# Patient Record
Sex: MALE | Race: WHITE | NOT HISPANIC OR LATINO | Employment: FULL TIME | URBAN - NONMETROPOLITAN AREA
[De-identification: names, ages, dates, MRNs, and addresses within clinical notes are randomized per-mention and may not be internally consistent; named-entity substitution may affect disease eponyms.]

---

## 2018-04-11 RX ORDER — OLMESARTAN MEDOXOMIL 40 MG/1
40 TABLET ORAL DAILY
COMMUNITY
Start: 2018-02-07

## 2018-04-11 RX ORDER — ESOMEPRAZOLE MAGNESIUM 40 MG/1
40 CAPSULE, DELAYED RELEASE ORAL
COMMUNITY

## 2018-04-11 RX ORDER — PROPRANOLOL HYDROCHLORIDE 80 MG/1
80 TABLET ORAL DAILY
Status: ON HOLD | COMMUNITY
End: 2018-05-08

## 2018-04-12 ENCOUNTER — OFFICE VISIT (OUTPATIENT)
Dept: GASTROENTEROLOGY | Facility: CLINIC | Age: 68
End: 2018-04-12

## 2018-04-12 VITALS
BODY MASS INDEX: 21.48 KG/M2 | WEIGHT: 145 LBS | HEART RATE: 80 BPM | TEMPERATURE: 98.2 F | HEIGHT: 69 IN | OXYGEN SATURATION: 99 %

## 2018-04-12 DIAGNOSIS — Z12.11 ENCOUNTER FOR SCREENING FOR MALIGNANT NEOPLASM OF COLON: Primary | ICD-10-CM

## 2018-04-12 PROCEDURE — S0260 H&P FOR SURGERY: HCPCS | Performed by: NURSE PRACTITIONER

## 2018-04-12 NOTE — PROGRESS NOTES
Chief Complaint   Patient presents with   • Colonoscopy     had colon several years ago dr. wright was good     Subjective   HPI    Jacob Saravia is a 67 y.o. male who presents to office for preventative maintenance.  There is not  a personal history of colon polyps.  There is not a history of colon cancer.  He does not have complaints of nausea/vomiting, change in bowels, weight loss, no , no melena.  There is not a family history of colon cancer.  There is not a family history of colon polyps.  Pt last colonoscopy-no polyps with Dr Wright .  Bowels do move on regular basis.  Bleeding x 20 yrs.  Bright red blood will drip into toilet.  Bleeding has worsened.  Pt denies pain when sitting.    Scheduled with Dr Mckinney for Hemorrhoidectomy, recommended to have cscope prior to surgery    Reports no previous treatment of hemorrhoids due to hx of PCV and reports topical steroids increase PVC, with exception of soaks in bath.  States hemorrhoids are effecting day to day activity.    Past Medical History:   Diagnosis Date   • Hyperlipidemia    • Hypertension      Past Surgical History:   Procedure Laterality Date   • CHOLECYSTECTOMY       Outpatient Prescriptions Marked as Taking for the 4/12/18 encounter (Office Visit) with CAMERON Singleton   Medication Sig Dispense Refill   • esomeprazole (NEXIUM) 2.5 MG packet Take 2.5 mg by mouth 2 (Two) Times a Day.     • olmesartan (BENICAR) 40 MG tablet      • propranolol (INDERAL) 80 MG tablet Take 80 mg by mouth 3 (Three) Times a Day.       Allergies   Allergen Reactions   • Crestor [Rosuvastatin Calcium] Unknown (See Comments)     reflux   • Decongestant [Oxymetazoline] Other (See Comments)     bothers heart   • Erythromycin Base Unknown (See Comments)     unsure   • Ezetimibe Other (See Comments)     unsure   • Omeprazole Other (See Comments)     hearache   • Pravachol [Pravastatin Sodium] Other (See Comments)     reflux     Social History     Social History   •  Marital status:      Spouse name: N/A   • Number of children: N/A   • Years of education: N/A     Occupational History   • Not on file.     Social History Main Topics   • Smoking status: Former Smoker   • Smokeless tobacco: Never Used   • Alcohol use No   • Drug use: No   • Sexual activity: Not on file     Other Topics Concern   • Not on file     Social History Narrative   • No narrative on file     Family History   Problem Relation Age of Onset   • Colon cancer Neg Hx    • Esophageal cancer Neg Hx      Review of Systems   Constitutional: Negative for fatigue, fever and unexpected weight change.   HENT: Negative for hearing loss, sore throat and voice change.    Eyes: Negative for visual disturbance.   Respiratory: Negative for cough, shortness of breath and wheezing.    Cardiovascular: Negative for chest pain and palpitations.   Gastrointestinal: Positive for anal bleeding. Negative for abdominal pain, blood in stool and vomiting.   Endocrine: Negative for polydipsia and polyuria.   Genitourinary: Negative for difficulty urinating, dysuria, hematuria and urgency.   Musculoskeletal: Negative for joint swelling and myalgias.   Skin: Negative for color change, rash and wound.   Neurological: Negative for dizziness, tremors, seizures and syncope.   Hematological: Does not bruise/bleed easily.   Psychiatric/Behavioral: Negative for agitation and confusion. The patient is not nervous/anxious.      Objective   Vitals:    04/12/18 1353   Pulse: 80   Temp: 98.2 °F (36.8 °C)   SpO2: 99%     Physical Exam   Constitutional: He is oriented to person, place, and time. He appears well-developed and well-nourished. He is cooperative.   HENT:   Head: Normocephalic and atraumatic.   Eyes: Conjunctivae are normal. Pupils are equal, round, and reactive to light. No scleral icterus.   Neck: Normal range of motion. Neck supple. No JVD present. No thyroid mass and no thyromegaly present.   Cardiovascular: Normal rate, regular  rhythm and normal heart sounds.  Exam reveals no gallop and no friction rub.    No murmur heard.  Pulmonary/Chest: Effort normal and breath sounds normal. No accessory muscle usage. No respiratory distress. He has no wheezes. He has no rales.   Abdominal: Soft. Normal appearance and bowel sounds are normal. He exhibits no distension, no ascites and no mass. There is no hepatosplenomegaly. There is no tenderness. There is no rebound and no guarding.   Musculoskeletal: Normal range of motion. He exhibits no edema or tenderness.     Vascular Status -  His right foot exhibits normal foot vasculature  and no edema. His left foot exhibits normal foot vasculature  and no edema.  Lymphadenopathy:     He has no cervical adenopathy.   Neurological: He is alert and oriented to person, place, and time. He has normal strength. Gait normal.   Skin: Skin is warm, dry and intact. No rash noted.     Imaging Results (most recent)     None        Body mass index is 21.41 kg/m².    Assessment/Plan   Jacob was seen today for colonoscopy.    Diagnoses and all orders for this visit:    Encounter for screening for malignant neoplasm of colon      Colonoscopy evaluation is recommended.  I explained bleeding was likely r/t hemorrhoids, however, I could not say this with 100% certainty as it has been over 10 yrs since last cscope.  Pt hesitant to proceed due to prep and bleeding he fears he will experience due to BM.  Explained he will be required to complete prep prior to procedure.   Pt also has concerns regarding the bleeding he may experience due to insertion of colonoscope.  I suggested 1/2-1 dose Miralax daily starting today and maybe night prior to procedure would not be as difficult.  Pt stated he was unable to take Miralax due to urgency he has experienced in the past.  Pt reports he has apt with Dr Mckinney next week.  I encouraged pt to speak further to Dr Mckinney.  If cscope is required prior to surgery I will work with date for  surgery so pt may have cscope prior.  If pt changes his mind and wishes to proceed with scheduling scope he was instructed to call the office.  * Surgery not found *          There are no Patient Instructions on file for this visit.

## 2018-04-18 ENCOUNTER — TELEPHONE (OUTPATIENT)
Dept: GASTROENTEROLOGY | Facility: CLINIC | Age: 68
End: 2018-04-18

## 2018-05-01 ENCOUNTER — HOSPITAL ENCOUNTER (OUTPATIENT)
Dept: GENERAL RADIOLOGY | Facility: HOSPITAL | Age: 68
Discharge: HOME | End: 2018-05-01
Admitting: SPECIALIST

## 2018-05-01 ENCOUNTER — APPOINTMENT (OUTPATIENT)
Dept: PREADMISSION TESTING | Facility: HOSPITAL | Age: 68
End: 2018-05-01

## 2018-05-01 VITALS — HEART RATE: 70 BPM | HEIGHT: 70 IN | WEIGHT: 143.52 LBS | BODY MASS INDEX: 20.55 KG/M2 | RESPIRATION RATE: 20 BRPM

## 2018-05-01 LAB
ALBUMIN SERPL-MCNC: 4.5 G/DL (ref 3.5–5)
ALBUMIN/GLOB SERPL: 1.4 G/DL (ref 1.1–2.5)
ALP SERPL-CCNC: 77 U/L (ref 24–120)
ALT SERPL W P-5'-P-CCNC: 28 U/L (ref 0–54)
ANION GAP SERPL CALCULATED.3IONS-SCNC: 10 MMOL/L (ref 4–13)
AST SERPL-CCNC: 22 U/L (ref 7–45)
BASOPHILS # BLD AUTO: 0.07 10*3/MM3 (ref 0–0.2)
BASOPHILS NFR BLD AUTO: 0.9 % (ref 0–2)
BILIRUB SERPL-MCNC: 0.5 MG/DL (ref 0.1–1)
BUN BLD-MCNC: 23 MG/DL (ref 5–21)
BUN/CREAT SERPL: 17.8 (ref 7–25)
CALCIUM SPEC-SCNC: 9.9 MG/DL (ref 8.4–10.4)
CHLORIDE SERPL-SCNC: 99 MMOL/L (ref 98–110)
CO2 SERPL-SCNC: 29 MMOL/L (ref 24–31)
CREAT BLD-MCNC: 1.29 MG/DL (ref 0.5–1.4)
DEPRECATED RDW RBC AUTO: 41.4 FL (ref 40–54)
EOSINOPHIL # BLD AUTO: 0.25 10*3/MM3 (ref 0–0.7)
EOSINOPHIL NFR BLD AUTO: 3.2 % (ref 0–4)
ERYTHROCYTE [DISTWIDTH] IN BLOOD BY AUTOMATED COUNT: 12.9 % (ref 12–15)
GFR SERPL CREATININE-BSD FRML MDRD: 56 ML/MIN/1.73
GLOBULIN UR ELPH-MCNC: 3.2 GM/DL
GLUCOSE BLD-MCNC: 102 MG/DL (ref 70–100)
HCT VFR BLD AUTO: 39.7 % (ref 40–52)
HGB BLD-MCNC: 13.6 G/DL (ref 14–18)
IMM GRANULOCYTES # BLD: 0.04 10*3/MM3 (ref 0–0.03)
IMM GRANULOCYTES NFR BLD: 0.5 % (ref 0–5)
LYMPHOCYTES # BLD AUTO: 3.35 10*3/MM3 (ref 0.72–4.86)
LYMPHOCYTES NFR BLD AUTO: 43.5 % (ref 15–45)
MCH RBC QN AUTO: 30.2 PG (ref 28–32)
MCHC RBC AUTO-ENTMCNC: 34.3 G/DL (ref 33–36)
MCV RBC AUTO: 88.2 FL (ref 82–95)
MONOCYTES # BLD AUTO: 0.53 10*3/MM3 (ref 0.19–1.3)
MONOCYTES NFR BLD AUTO: 6.9 % (ref 4–12)
NEUTROPHILS # BLD AUTO: 3.47 10*3/MM3 (ref 1.87–8.4)
NEUTROPHILS NFR BLD AUTO: 45 % (ref 39–78)
NRBC BLD MANUAL-RTO: 0 /100 WBC (ref 0–0)
PLATELET # BLD AUTO: 246 10*3/MM3 (ref 130–400)
PMV BLD AUTO: 10.8 FL (ref 6–12)
POTASSIUM BLD-SCNC: 4.6 MMOL/L (ref 3.5–5.3)
PROT SERPL-MCNC: 7.7 G/DL (ref 6.3–8.7)
RBC # BLD AUTO: 4.5 10*6/MM3 (ref 4.8–5.9)
SODIUM BLD-SCNC: 138 MMOL/L (ref 135–145)
WBC NRBC COR # BLD: 7.71 10*3/MM3 (ref 4.8–10.8)

## 2018-05-01 PROCEDURE — 85025 COMPLETE CBC W/AUTO DIFF WBC: CPT | Performed by: SPECIALIST

## 2018-05-01 PROCEDURE — 80053 COMPREHEN METABOLIC PANEL: CPT | Performed by: SPECIALIST

## 2018-05-01 PROCEDURE — 71045 X-RAY EXAM CHEST 1 VIEW: CPT

## 2018-05-01 PROCEDURE — 93005 ELECTROCARDIOGRAM TRACING: CPT

## 2018-05-01 PROCEDURE — 93010 ELECTROCARDIOGRAM REPORT: CPT | Performed by: INTERNAL MEDICINE

## 2018-05-01 NOTE — DISCHARGE INSTRUCTIONS
DAY OF SURGERY INSTRUCTIONS        YOUR SURGEON: dr boucher    PROCEDURE: ***hemorrhoidectomy    DATE OF SURGERY: ***5/8/2018    ARRIVAL TIME: AS DIRECTED BY OFFICE    DAY OF SURGERY TAKE ONLY THESE MEDICATIONS UNLESS OTHERWISE INSTRUCTED BY YOUR PHYSICIAN: ***propanolol          MANAGING PAIN AFTER SURGERY    We know you are probably wondering what your pain will be like after surgery.  Following surgery it is unrealistic to expect you will not have pain.   Pain is how our bodies let us know that something is wrong or cautions us to be careful.  That said, our goal is to make your pain tolerable.    Methods we may use to treat your pain include (oral or IV medications, PCAs, epidurals, nerve blocks, etc.)   While some procedures require IV pain medications for a short time after surgery, transitioning to pain medications by mouth allows for better management of pain.   Your nurse will encourage you to take oral pain medications whenever possible.  IV medications work almost immediately, but only last a short while.  Taking medications by mouth allows for a more constant level of medication in your blood stream for a longer period of time.      Once your pain is out of control it is harder to get back under control.  It is important you are aware when your next dose of pain medication is due.  If you are admitted, your nurse may write the time of your next dose on the white board in your room to help you remember.      We are interested in your pain and encourage you to inform us about aggravating factors during your visit.   Many times a simple repositioning every few hours can make a big difference.    If your physician says it is okay, do not let your pain prevent you from getting out of bed. Be sure to call your nurse for assistance prior to getting up so you do not fall.      Before surgery, please decide your tolerable pain goal.  These faces help describe the pain ratings we use on a 0-10 scale.   Be prepared  to tell us your goal and whether or not you take pain or anxiety medications at home.            BEFORE YOU COME TO THE HOSPITAL  (Pre-op instructions)  • Do not eat, drink, smoke or chew gum after midnight the night before surgery.  This also includes no mints.  • Morning of surgery take only the medicines you have been instructed with a sip of water unless otherwise instructed  by your physician.  • Do not shave, wear makeup or dark nail polish.  • Remove all jewelry including rings.  • Leave anything you consider valuable at home.  • Leave your suitcase in the car until after your surgery.  • Bring the following with you if applicable:  o Picture ID and insurance, Medicare or Medicaid cards  o Co-pay/deductible required by insurance (cash, check, credit card)  o Copy of advance directive, living will or power-of- documents if not brought to PAT  o CPAP or BIPAP mask and tubing  o Relaxation aids (MP3 player, book, magazine)  • On the day of surgery check in at registration located at the main entrance of the hospital.       Outpatient Surgery Guidelines, Adult  Outpatient procedures are those for which the person having the procedure is allowed to go home the same day as the procedure. Various procedures are done on an outpatient basis. You should follow some general guidelines if you will be having an outpatient procedure.  LET YOUR HEALTH CARE PROVIDER KNOW ABOUT:  · Any allergies you have.  · All medicines you are taking, including vitamins, herbs, eye drops, creams, and over-the-counter medicines.  · Previous problems you or members of your family have had with the use of anesthetics.  · Any blood disorders you have.  · Previous surgeries you have had.  · Medical conditions you have.  RISKS AND COMPLICATIONS  Your health care provider will discuss possible risks and complications with you before surgery. Common risks and complications include:    · Problems due to the use of anesthetics.  · Blood loss  and replacement (does not apply to minor surgical procedures).  · Temporary increase in pain due to surgery.  · Uncorrected pain or problems that the surgery was meant to correct.  · Infection.  · New damage.  BEFORE THE PROCEDURE  · Ask your health care provider about changing or stopping your regular medicines. You may need to stop taking certain medicines in the days or weeks before the procedure.  · Stop smoking at least 2 weeks before surgery. This lowers your risk for complications during and after surgery. Ask your health care provider for help with this if needed.  · Eat your usual meals and a light supper the day before surgery. Continue fluid intake. Do not drink alcohol.  · Do not eat or drink after midnight the night before your surgery.   · Arrange for someone to take you home and to stay with you for 24 hours after the procedure. Medicine given for your procedure may affect your ability to drive or to care for yourself.  · Call your health care provider's office if you develop an illness or problem that may prevent you from safely having your procedure.  AFTER THE PROCEDURE  After surgery, you will be taken to a recovery area, where your progress will be monitored. If there are no complications, you will be allowed to go home when you are awake, stable, and taking fluids well. You may have numbness around the surgical site. Healing will take some time. You will have tenderness at the surgical site and may have some swelling and bruising. You may also have some nausea.  HOME CARE INSTRUCTIONS  · Do not drive for 24 hours, or as directed by your health care provider. Do not drive while taking prescription pain medicines.  · Do not drink alcohol for 24 hours.  · Do not make important decisions or sign legal documents for 24 hours.  · You may resume a normal diet and activities as directed.  · Do not lift anything heavier than 10 pounds (4.5 kg) or play contact sports until your health care provider says  it is okay.  · Change your bandages (dressings) as directed.  · Only take over-the-counter or prescription medicines as directed by your health care provider.  · Follow up with your health care provider as directed.  SEEK MEDICAL CARE IF:  · You have increased bleeding (more than a small spot) from the surgical site.  · You have redness, swelling, or increasing pain in the wound.  · You see pus coming from the wound.  · You have a fever.  · You notice a bad smell coming from the wound or dressing.  · You feel lightheaded or faint.  · You develop a rash.  · You have trouble breathing.  · You develop allergies.  MAKE SURE YOU:  · Understand these instructions.  · Will watch your condition.  · Will get help right away if you are not doing well or get worse.     This information is not intended to replace advice given to you by your health care provider. Make sure you discuss any questions you have with your health care provider.     Document Released: 09/12/2002 Document Revised: 05/03/2016 Document Reviewed: 05/22/2014  [employer] Interactive Patient Education ©2016 [employer]       Fall Prevention in Hospitals, Adult  As a hospital patient, your condition and the treatments you receive can increase your risk for falls. Some additional risk factors for falls in a hospital include:  · Being in an unfamiliar environment.  · Being on bed rest.  · Your surgery.  · Taking certain medicines.  · Your tubing requirements, such as intravenous (IV) therapy or catheters.  It is important that you learn how to decrease fall risks while at the hospital. Below are important tips that can help prevent falls.  SAFETY TIPS FOR PREVENTING FALLS  Talk about your risk of falling.  · Ask your health care provider why you are at risk for falling. Is it your medicine, illness, tubing placement, or something else?  · Make a plan with your health care provider to keep you safe from falls.  · Ask your health care provider or pharmacist about  side effects of your medicines. Some medicines can make you dizzy or affect your coordination.  Ask for help.  · Ask for help before getting out of bed. You may need to press your call button.  · Ask for assistance in getting safely to the toilet.  · Ask for a walker or cane to be put at your bedside. Ask that most of the side rails on your bed be placed up before your health care provider leaves the room.  · Ask family or friends to sit with you.  · Ask for things that are out of your reach, such as your glasses, hearing aids, telephone, bedside table, or call button.  Follow these tips to avoid falling:  · Stay lying or seated, rather than standing, while waiting for help.  · Wear rubber-soled slippers or shoes whenever you walk in the hospital.  · Avoid quick, sudden movements.  ¨ Change positions slowly.  ¨ Sit on the side of your bed before standing.  ¨ Stand up slowly and wait before you start to walk.  · Let your health care provider know if there is a spill on the floor.  · Pay careful attention to the medical equipment, electrical cords, and tubes around you.  · When you need help, use your call button by your bed or in the bathroom. Wait for one of your health care providers to help you.  · If you feel dizzy or unsure of your footing, return to bed and wait for assistance.  · Avoid being distracted by the TV, telephone, or another person in your room.  · Do not lean or support yourself on rolling objects, such as IV poles or bedside tables.     This information is not intended to replace advice given to you by your health care provider. Make sure you discuss any questions you have with your health care provider.     Document Released: 12/15/2001 Document Revised: 01/08/2016 Document Reviewed: 08/25/2013  [employer] Interactive Patient Education ©2016 [employer]       Surgical Site Infections FAQs  What is a Surgical Site Infection (SSI)?  A surgical site infection is an infection that occurs after surgery  in the part of the body where the surgery took place. Most patients who have surgery do not develop an infection. However, infections develop in about 1 to 3 out of every 100 patients who have surgery.  Some of the common symptoms of a surgical site infection are:  · Redness and pain around the area where you had surgery  · Drainage of cloudy fluid from your surgical wound  · Fever  Can SSIs be treated?  Yes. Most surgical site infections can be treated with antibiotics. The antibiotic given to you depends on the bacteria (germs) causing the infection. Sometimes patients with SSIs also need another surgery to treat the infection.  What are some of the things that hospitals are doing to prevent SSIs?  To prevent SSIs, doctors, nurses, and other healthcare providers:  · Clean their hands and arms up to their elbows with an antiseptic agent just before the surgery.  · Clean their hands with soap and water or an alcohol-based hand rub before and after caring for each patient.  · May remove some of your hair immediately before your surgery using electric clippers if the hair is in the same area where the procedure will occur. They should not shave you with a razor.  · Wear special hair covers, masks, gowns, and gloves during surgery to keep the surgery area clean.  · Give you antibiotics before your surgery starts. In most cases, you should get antibiotics within 60 minutes before the surgery starts and the antibiotics should be stopped within 24 hours after surgery.  · Clean the skin at the site of your surgery with a special soap that kills germs.  What can I do to help prevent SSIs?  Before your surgery:  · Tell your doctor about other medical problems you may have. Health problems such as allergies, diabetes, and obesity could affect your surgery and your treatment.  · Quit smoking. Patients who smoke get more infections. Talk to your doctor about how you can quit before your surgery.  · Do not shave near where you  will have surgery. Shaving with a razor can irritate your skin and make it easier to develop an infection.  At the time of your surgery:  · Speak up if someone tries to shave you with a razor before surgery. Ask why you need to be shaved and talk with your surgeon if you have any concerns.  · Ask if you will get antibiotics before surgery.  After your surgery:  · Make sure that your healthcare providers clean their hands before examining you, either with soap and water or an alcohol-based hand rub.  · If you do not see your providers clean their hands, please ask them to do so.  · Family and friends who visit you should not touch the surgical wound or dressings.  · Family and friends should clean their hands with soap and water or an alcohol-based hand rub before and after visiting you. If you do not see them clean their hands, ask them to clean their hands.  What do I need to do when I go home from the hospital?  · Before you go home, your doctor or nurse should explain everything you need to know about taking care of your wound. Make sure you understand how to care for your wound before you leave the hospital.  · Always clean your hands before and after caring for your wound.  · Before you go home, make sure you know who to contact if you have questions or problems after you get home.  · If you have any symptoms of an infection, such as redness and pain at the surgery site, drainage, or fever, call your doctor immediately.  If you have additional questions, please ask your doctor or nurse.  Developed and co-sponsored by The Society for Healthcare Epidemiology of [place] (SHEA); Infectious Diseases Society of [place] (IDSA); American Hospital Association; Association for Professionals in Infection Control and Epidemiology (APIC); Centers for Disease Control and Prevention (CDC); and The Joint Commission.     This information is not intended to replace advice given to you by your health care provider. Make sure you  discuss any questions you have with your health care provider.     Document Released: 12/23/2014 Document Revised: 01/08/2016 Document Reviewed: 03/02/2016  [employer] Interactive Patient Education ©2016 [employer]     PATIENT/FAMILY/RESPONSIBLE PARTY VERBALIZES UNDERSTANDING OF ABOVE EDUCATION.  COPY OF PAIN SCALE GIVEN AND REVIEWED WITH VERBALIZED UNDERSTANDING.

## 2018-05-08 ENCOUNTER — ANESTHESIA EVENT (OUTPATIENT)
Dept: PERIOP | Facility: HOSPITAL | Age: 68
End: 2018-05-08

## 2018-05-08 ENCOUNTER — ANESTHESIA (OUTPATIENT)
Dept: PERIOP | Facility: HOSPITAL | Age: 68
End: 2018-05-08

## 2018-05-08 ENCOUNTER — HOSPITAL ENCOUNTER (OUTPATIENT)
Facility: HOSPITAL | Age: 68
Discharge: HOME | End: 2018-05-09
Attending: SPECIALIST | Admitting: SPECIALIST

## 2018-05-08 DIAGNOSIS — K64.9 HEMORRHOIDS: ICD-10-CM

## 2018-05-08 PROCEDURE — 25010000002 PROPOFOL 10 MG/ML EMULSION: Performed by: NURSE ANESTHETIST, CERTIFIED REGISTERED

## 2018-05-08 PROCEDURE — 88304 TISSUE EXAM BY PATHOLOGIST: CPT | Performed by: SPECIALIST

## 2018-05-08 PROCEDURE — A9270 NON-COVERED ITEM OR SERVICE: HCPCS | Performed by: SPECIALIST

## 2018-05-08 PROCEDURE — 94799 UNLISTED PULMONARY SVC/PX: CPT

## 2018-05-08 PROCEDURE — 25010000002 SUCCINYLCHOLINE PER 20 MG: Performed by: NURSE ANESTHETIST, CERTIFIED REGISTERED

## 2018-05-08 PROCEDURE — 25010000002 MIDAZOLAM PER 1 MG: Performed by: NURSE ANESTHETIST, CERTIFIED REGISTERED

## 2018-05-08 PROCEDURE — 25010000002 DEXAMETHASONE PER 1 MG: Performed by: NURSE ANESTHETIST, CERTIFIED REGISTERED

## 2018-05-08 PROCEDURE — 63710000001 OXYCODONE-ACETAMINOPHEN 5-325 MG TABLET: Performed by: SPECIALIST

## 2018-05-08 PROCEDURE — 63710000001 SUCRALFATE 1 G TABLET: Performed by: SPECIALIST

## 2018-05-08 PROCEDURE — 25010000002 ONDANSETRON PER 1 MG: Performed by: NURSE ANESTHETIST, CERTIFIED REGISTERED

## 2018-05-08 PROCEDURE — 25010000002 KETOROLAC TROMETHAMINE PER 15 MG: Performed by: NURSE ANESTHETIST, CERTIFIED REGISTERED

## 2018-05-08 PROCEDURE — 25010000002 FENTANYL CITRATE (PF) 100 MCG/2ML SOLUTION: Performed by: NURSE ANESTHETIST, CERTIFIED REGISTERED

## 2018-05-08 PROCEDURE — 63710000001 FAMOTIDINE 20 MG TABLET: Performed by: SPECIALIST

## 2018-05-08 PROCEDURE — G0378 HOSPITAL OBSERVATION PER HR: HCPCS

## 2018-05-08 PROCEDURE — 25010000002 CEFOXITIN PER 1 G: Performed by: SPECIALIST

## 2018-05-08 PROCEDURE — 25810000003 POTASSIUM CHLORIDE PER 2 MEQ: Performed by: SPECIALIST

## 2018-05-08 PROCEDURE — 63710000001 METHYLCELLULOSE (LAXATIVE) 500 MG TABLET: Performed by: SPECIALIST

## 2018-05-08 RX ORDER — ROCURONIUM BROMIDE 10 MG/ML
INJECTION, SOLUTION INTRAVENOUS AS NEEDED
Status: DISCONTINUED | OUTPATIENT
Start: 2018-05-08 | End: 2018-05-08 | Stop reason: SURG

## 2018-05-08 RX ORDER — ONDANSETRON 2 MG/ML
4 INJECTION INTRAMUSCULAR; INTRAVENOUS ONCE AS NEEDED
Status: DISCONTINUED | OUTPATIENT
Start: 2018-05-08 | End: 2018-05-08 | Stop reason: HOSPADM

## 2018-05-08 RX ORDER — OXYCODONE AND ACETAMINOPHEN 10; 325 MG/1; MG/1
1 TABLET ORAL ONCE AS NEEDED
Status: DISCONTINUED | OUTPATIENT
Start: 2018-05-08 | End: 2018-05-08 | Stop reason: HOSPADM

## 2018-05-08 RX ORDER — SODIUM CHLORIDE, SODIUM LACTATE, POTASSIUM CHLORIDE, CALCIUM CHLORIDE 600; 310; 30; 20 MG/100ML; MG/100ML; MG/100ML; MG/100ML
1000 INJECTION, SOLUTION INTRAVENOUS CONTINUOUS
Status: DISCONTINUED | OUTPATIENT
Start: 2018-05-08 | End: 2018-05-08

## 2018-05-08 RX ORDER — SODIUM CHLORIDE AND POTASSIUM CHLORIDE 150; 450 MG/100ML; MG/100ML
100 INJECTION, SOLUTION INTRAVENOUS CONTINUOUS
Status: DISCONTINUED | OUTPATIENT
Start: 2018-05-08 | End: 2018-05-09 | Stop reason: HOSPADM

## 2018-05-08 RX ORDER — LIDOCAINE HYDROCHLORIDE 40 MG/ML
SOLUTION TOPICAL AS NEEDED
Status: DISCONTINUED | OUTPATIENT
Start: 2018-05-08 | End: 2018-05-08 | Stop reason: SURG

## 2018-05-08 RX ORDER — MIDAZOLAM HYDROCHLORIDE 1 MG/ML
2 INJECTION INTRAMUSCULAR; INTRAVENOUS
Status: DISCONTINUED | OUTPATIENT
Start: 2018-05-08 | End: 2018-05-08 | Stop reason: HOSPADM

## 2018-05-08 RX ORDER — MIDAZOLAM HYDROCHLORIDE 1 MG/ML
1 INJECTION INTRAMUSCULAR; INTRAVENOUS
Status: DISCONTINUED | OUTPATIENT
Start: 2018-05-08 | End: 2018-05-08 | Stop reason: HOSPADM

## 2018-05-08 RX ORDER — PROPRANOLOL HYDROCHLORIDE 40 MG/1
80 TABLET ORAL DAILY
Status: DISCONTINUED | OUTPATIENT
Start: 2018-05-08 | End: 2018-05-09 | Stop reason: HOSPADM

## 2018-05-08 RX ORDER — ONDANSETRON 2 MG/ML
INJECTION INTRAMUSCULAR; INTRAVENOUS AS NEEDED
Status: DISCONTINUED | OUTPATIENT
Start: 2018-05-08 | End: 2018-05-08 | Stop reason: SURG

## 2018-05-08 RX ORDER — MORPHINE SULFATE 4 MG/ML
4 INJECTION, SOLUTION INTRAMUSCULAR; INTRAVENOUS
Status: DISCONTINUED | OUTPATIENT
Start: 2018-05-08 | End: 2018-05-09 | Stop reason: HOSPADM

## 2018-05-08 RX ORDER — DEXAMETHASONE SODIUM PHOSPHATE 4 MG/ML
INJECTION, SOLUTION INTRA-ARTICULAR; INTRALESIONAL; INTRAMUSCULAR; INTRAVENOUS; SOFT TISSUE AS NEEDED
Status: DISCONTINUED | OUTPATIENT
Start: 2018-05-08 | End: 2018-05-08 | Stop reason: SURG

## 2018-05-08 RX ORDER — NALOXONE HCL 0.4 MG/ML
0.4 VIAL (ML) INJECTION
Status: DISCONTINUED | OUTPATIENT
Start: 2018-05-08 | End: 2018-05-09 | Stop reason: HOSPADM

## 2018-05-08 RX ORDER — SODIUM CHLORIDE 0.9 % (FLUSH) 0.9 %
3 SYRINGE (ML) INJECTION AS NEEDED
Status: DISCONTINUED | OUTPATIENT
Start: 2018-05-08 | End: 2018-05-08 | Stop reason: HOSPADM

## 2018-05-08 RX ORDER — FAMOTIDINE 20 MG/1
20 TABLET, FILM COATED ORAL 2 TIMES DAILY
Status: DISCONTINUED | OUTPATIENT
Start: 2018-05-08 | End: 2018-05-09 | Stop reason: HOSPADM

## 2018-05-08 RX ORDER — ULTRASOUND COUPLING MEDIUM
GEL (GRAM) TOPICAL AS NEEDED
Status: DISCONTINUED | OUTPATIENT
Start: 2018-05-08 | End: 2018-05-08 | Stop reason: HOSPADM

## 2018-05-08 RX ORDER — FENTANYL CITRATE 50 UG/ML
25 INJECTION, SOLUTION INTRAMUSCULAR; INTRAVENOUS AS NEEDED
Status: DISCONTINUED | OUTPATIENT
Start: 2018-05-08 | End: 2018-05-08 | Stop reason: HOSPADM

## 2018-05-08 RX ORDER — PHENYLEPHRINE HCL IN 0.9% NACL 0.8MG/10ML
SYRINGE (ML) INTRAVENOUS AS NEEDED
Status: DISCONTINUED | OUTPATIENT
Start: 2018-05-08 | End: 2018-05-08 | Stop reason: SURG

## 2018-05-08 RX ORDER — MORPHINE SULFATE 4 MG/ML
4 INJECTION, SOLUTION INTRAMUSCULAR; INTRAVENOUS
Status: DISCONTINUED | OUTPATIENT
Start: 2018-05-08 | End: 2018-05-08 | Stop reason: SDUPTHER

## 2018-05-08 RX ORDER — IPRATROPIUM BROMIDE AND ALBUTEROL SULFATE 2.5; .5 MG/3ML; MG/3ML
3 SOLUTION RESPIRATORY (INHALATION) ONCE AS NEEDED
Status: DISCONTINUED | OUTPATIENT
Start: 2018-05-08 | End: 2018-05-08 | Stop reason: HOSPADM

## 2018-05-08 RX ORDER — LORAZEPAM 1 MG/1
1 TABLET ORAL EVERY 6 HOURS PRN
Status: DISCONTINUED | OUTPATIENT
Start: 2018-05-08 | End: 2018-05-09 | Stop reason: HOSPADM

## 2018-05-08 RX ORDER — ONDANSETRON 8 MG/1
8 TABLET, ORALLY DISINTEGRATING ORAL EVERY 6 HOURS PRN
Status: DISCONTINUED | OUTPATIENT
Start: 2018-05-08 | End: 2018-05-09 | Stop reason: HOSPADM

## 2018-05-08 RX ORDER — SUCRALFATE 1 G/1
1 TABLET ORAL
Status: DISCONTINUED | OUTPATIENT
Start: 2018-05-08 | End: 2018-05-09 | Stop reason: HOSPADM

## 2018-05-08 RX ORDER — SUCCINYLCHOLINE CHLORIDE 20 MG/ML
INJECTION INTRAMUSCULAR; INTRAVENOUS AS NEEDED
Status: DISCONTINUED | OUTPATIENT
Start: 2018-05-08 | End: 2018-05-08 | Stop reason: SURG

## 2018-05-08 RX ORDER — SODIUM CHLORIDE 0.9 % (FLUSH) 0.9 %
1-10 SYRINGE (ML) INJECTION AS NEEDED
Status: DISCONTINUED | OUTPATIENT
Start: 2018-05-08 | End: 2018-05-08 | Stop reason: HOSPADM

## 2018-05-08 RX ORDER — BUPIVACAINE HYDROCHLORIDE AND EPINEPHRINE 5; 5 MG/ML; UG/ML
INJECTION, SOLUTION PERINEURAL AS NEEDED
Status: DISCONTINUED | OUTPATIENT
Start: 2018-05-08 | End: 2018-05-08 | Stop reason: HOSPADM

## 2018-05-08 RX ORDER — LIDOCAINE HYDROCHLORIDE 20 MG/ML
INJECTION, SOLUTION INFILTRATION; PERINEURAL AS NEEDED
Status: DISCONTINUED | OUTPATIENT
Start: 2018-05-08 | End: 2018-05-08 | Stop reason: SURG

## 2018-05-08 RX ORDER — KETOROLAC TROMETHAMINE 30 MG/ML
INJECTION, SOLUTION INTRAMUSCULAR; INTRAVENOUS AS NEEDED
Status: DISCONTINUED | OUTPATIENT
Start: 2018-05-08 | End: 2018-05-08 | Stop reason: SURG

## 2018-05-08 RX ORDER — ACETAMINOPHEN 500 MG
1000 TABLET ORAL ONCE
Status: COMPLETED | OUTPATIENT
Start: 2018-05-08 | End: 2018-05-08

## 2018-05-08 RX ORDER — MAGNESIUM HYDROXIDE 1200 MG/15ML
LIQUID ORAL AS NEEDED
Status: DISCONTINUED | OUTPATIENT
Start: 2018-05-08 | End: 2018-05-08 | Stop reason: HOSPADM

## 2018-05-08 RX ORDER — PROPOFOL 10 MG/ML
VIAL (ML) INTRAVENOUS AS NEEDED
Status: DISCONTINUED | OUTPATIENT
Start: 2018-05-08 | End: 2018-05-08 | Stop reason: SURG

## 2018-05-08 RX ORDER — IBUPROFEN 600 MG/1
600 TABLET ORAL ONCE AS NEEDED
Status: DISCONTINUED | OUTPATIENT
Start: 2018-05-08 | End: 2018-05-08 | Stop reason: HOSPADM

## 2018-05-08 RX ORDER — PROPRANOLOL HYDROCHLORIDE 80 MG/1
80 CAPSULE, EXTENDED RELEASE ORAL DAILY
COMMUNITY

## 2018-05-08 RX ORDER — MORPHINE SULFATE 10 MG/ML
6 INJECTION INTRAMUSCULAR; INTRAVENOUS; SUBCUTANEOUS
Status: DISCONTINUED | OUTPATIENT
Start: 2018-05-08 | End: 2018-05-09 | Stop reason: HOSPADM

## 2018-05-08 RX ORDER — METOCLOPRAMIDE HYDROCHLORIDE 5 MG/ML
5 INJECTION INTRAMUSCULAR; INTRAVENOUS
Status: DISCONTINUED | OUTPATIENT
Start: 2018-05-08 | End: 2018-05-08 | Stop reason: HOSPADM

## 2018-05-08 RX ORDER — NALOXONE HCL 0.4 MG/ML
0.4 VIAL (ML) INJECTION AS NEEDED
Status: DISCONTINUED | OUTPATIENT
Start: 2018-05-08 | End: 2018-05-08 | Stop reason: HOSPADM

## 2018-05-08 RX ORDER — LABETALOL HYDROCHLORIDE 5 MG/ML
5 INJECTION, SOLUTION INTRAVENOUS
Status: DISCONTINUED | OUTPATIENT
Start: 2018-05-08 | End: 2018-05-08 | Stop reason: HOSPADM

## 2018-05-08 RX ORDER — OXYCODONE HYDROCHLORIDE AND ACETAMINOPHEN 5; 325 MG/1; MG/1
1 TABLET ORAL EVERY 4 HOURS PRN
Status: DISCONTINUED | OUTPATIENT
Start: 2018-05-08 | End: 2018-05-09 | Stop reason: HOSPADM

## 2018-05-08 RX ORDER — OXYCODONE AND ACETAMINOPHEN 7.5; 325 MG/1; MG/1
2 TABLET ORAL ONCE AS NEEDED
Status: DISCONTINUED | OUTPATIENT
Start: 2018-05-08 | End: 2018-05-08 | Stop reason: HOSPADM

## 2018-05-08 RX ORDER — FENTANYL CITRATE 50 UG/ML
INJECTION, SOLUTION INTRAMUSCULAR; INTRAVENOUS AS NEEDED
Status: DISCONTINUED | OUTPATIENT
Start: 2018-05-08 | End: 2018-05-08 | Stop reason: SURG

## 2018-05-08 RX ORDER — SODIUM CHLORIDE, SODIUM LACTATE, POTASSIUM CHLORIDE, CALCIUM CHLORIDE 600; 310; 30; 20 MG/100ML; MG/100ML; MG/100ML; MG/100ML
100 INJECTION, SOLUTION INTRAVENOUS CONTINUOUS
Status: DISCONTINUED | OUTPATIENT
Start: 2018-05-08 | End: 2018-05-08

## 2018-05-08 RX ORDER — MEPERIDINE HYDROCHLORIDE 25 MG/ML
12.5 INJECTION INTRAMUSCULAR; INTRAVENOUS; SUBCUTANEOUS
Status: DISCONTINUED | OUTPATIENT
Start: 2018-05-08 | End: 2018-05-08 | Stop reason: HOSPADM

## 2018-05-08 RX ORDER — OLMESARTAN MEDOXOMIL 20 MG/1
40 TABLET ORAL DAILY
Status: DISCONTINUED | OUTPATIENT
Start: 2018-05-08 | End: 2018-05-09 | Stop reason: HOSPADM

## 2018-05-08 RX ADMIN — SUCRALFATE 1 G: 1 TABLET ORAL at 21:22

## 2018-05-08 RX ADMIN — SODIUM CHLORIDE, POTASSIUM CHLORIDE, SODIUM LACTATE AND CALCIUM CHLORIDE 100 ML/HR: 600; 310; 30; 20 INJECTION, SOLUTION INTRAVENOUS at 09:57

## 2018-05-08 RX ADMIN — ACETAMINOPHEN 500 MG: 500 TABLET ORAL at 07:05

## 2018-05-08 RX ADMIN — EPHEDRINE SULFATE 10 MG: 50 INJECTION INTRAMUSCULAR; INTRAVENOUS; SUBCUTANEOUS at 08:11

## 2018-05-08 RX ADMIN — FENTANYL CITRATE 100 MCG: 50 INJECTION, SOLUTION INTRAMUSCULAR; INTRAVENOUS at 08:08

## 2018-05-08 RX ADMIN — KETOROLAC TROMETHAMINE 30 MG: 30 INJECTION, SOLUTION INTRAMUSCULAR at 08:26

## 2018-05-08 RX ADMIN — POTASSIUM CHLORIDE AND SODIUM CHLORIDE 100 ML/HR: 450; 150 INJECTION, SOLUTION INTRAVENOUS at 10:48

## 2018-05-08 RX ADMIN — EPHEDRINE SULFATE 10 MG: 50 INJECTION INTRAMUSCULAR; INTRAVENOUS; SUBCUTANEOUS at 08:14

## 2018-05-08 RX ADMIN — PROPOFOL 200 MG: 10 INJECTION, EMULSION INTRAVENOUS at 08:08

## 2018-05-08 RX ADMIN — CEFOXITIN SODIUM 1 G: 1 POWDER, FOR SOLUTION INTRAVENOUS at 10:48

## 2018-05-08 RX ADMIN — ROCURONIUM BROMIDE 5 MG: 10 INJECTION INTRAVENOUS at 08:08

## 2018-05-08 RX ADMIN — KETOROLAC TROMETHAMINE 30 MG: 30 INJECTION, SOLUTION INTRAMUSCULAR at 08:25

## 2018-05-08 RX ADMIN — CEFOXITIN SODIUM 1 G: 1 POWDER, FOR SOLUTION INTRAVENOUS at 22:23

## 2018-05-08 RX ADMIN — Medication 1000 MG: at 21:23

## 2018-05-08 RX ADMIN — METRONIDAZOLE 500 MG: 500 SOLUTION INTRAVENOUS at 08:00

## 2018-05-08 RX ADMIN — SUCCINYLCHOLINE CHLORIDE 140 MG: 20 INJECTION, SOLUTION INTRAMUSCULAR; INTRAVENOUS at 08:08

## 2018-05-08 RX ADMIN — SODIUM CHLORIDE, POTASSIUM CHLORIDE, SODIUM LACTATE AND CALCIUM CHLORIDE 1000 ML: 600; 310; 30; 20 INJECTION, SOLUTION INTRAVENOUS at 06:13

## 2018-05-08 RX ADMIN — LIDOCAINE HYDROCHLORIDE 100 MG: 20 INJECTION, SOLUTION INFILTRATION; PERINEURAL at 08:08

## 2018-05-08 RX ADMIN — DEXAMETHASONE SODIUM PHOSPHATE 4 MG: 4 INJECTION, SOLUTION INTRAMUSCULAR; INTRAVENOUS at 08:15

## 2018-05-08 RX ADMIN — ONDANSETRON HYDROCHLORIDE 4 MG: 2 SOLUTION INTRAMUSCULAR; INTRAVENOUS at 08:15

## 2018-05-08 RX ADMIN — LIDOCAINE HYDROCHLORIDE 1 EACH: 40 SOLUTION TOPICAL at 08:08

## 2018-05-08 RX ADMIN — LIDOCAINE HYDROCHLORIDE 0.5 ML: 10 INJECTION, SOLUTION EPIDURAL; INFILTRATION; INTRACAUDAL; PERINEURAL at 06:13

## 2018-05-08 RX ADMIN — OXYCODONE HYDROCHLORIDE AND ACETAMINOPHEN 1 TABLET: 5; 325 TABLET ORAL at 10:54

## 2018-05-08 RX ADMIN — METRONIDAZOLE 500 MG: 500 INJECTION, SOLUTION INTRAVENOUS at 13:20

## 2018-05-08 RX ADMIN — Medication 80 MCG: at 09:11

## 2018-05-08 RX ADMIN — SODIUM CHLORIDE, POTASSIUM CHLORIDE, SODIUM LACTATE AND CALCIUM CHLORIDE 1000 ML: 600; 310; 30; 20 INJECTION, SOLUTION INTRAVENOUS at 15:07

## 2018-05-08 RX ADMIN — Medication 80 MCG: at 09:12

## 2018-05-08 RX ADMIN — METRONIDAZOLE 500 MG: 500 INJECTION, SOLUTION INTRAVENOUS at 19:47

## 2018-05-08 RX ADMIN — EPHEDRINE SULFATE 10 MG: 50 INJECTION INTRAMUSCULAR; INTRAVENOUS; SUBCUTANEOUS at 08:19

## 2018-05-08 RX ADMIN — CEFOXITIN SODIUM 1 G: 1 POWDER, FOR SOLUTION INTRAVENOUS at 17:06

## 2018-05-08 RX ADMIN — POTASSIUM CHLORIDE AND SODIUM CHLORIDE 100 ML/HR: 450; 150 INJECTION, SOLUTION INTRAVENOUS at 22:11

## 2018-05-08 RX ADMIN — FAMOTIDINE 20 MG: 20 TABLET, FILM COATED ORAL at 21:23

## 2018-05-08 RX ADMIN — MIDAZOLAM HYDROCHLORIDE 2 MG: 1 INJECTION, SOLUTION INTRAMUSCULAR; INTRAVENOUS at 07:05

## 2018-05-08 NOTE — ANESTHESIA PROCEDURE NOTES
Airway  Urgency: elective    Airway not difficult    General Information and Staff    Patient location during procedure: OR  CRNA: JESSICA JORDAN    Indications and Patient Condition  Indications for airway management: airway protection    Preoxygenated: yes  MILS maintained throughout  Mask difficulty assessment: 1 - vent by mask    Final Airway Details  Final airway type: endotracheal airway      Successful airway: ETT  Cuffed: yes   Successful intubation technique: direct laryngoscopy  Facilitating devices/methods: intubating stylet  Endotracheal tube insertion site: oral  Blade: Scott  Blade size: #2  Cormack-Lehane Classification: grade I - full view of glottis  Placement verified by: chest auscultation, capnometry and palpation of cuff   Cuff volume (mL): 8  Measured from: teeth  ETT to teeth (cm): 22  Number of attempts at approach: 1

## 2018-05-08 NOTE — PLAN OF CARE
Problem: Patient Care Overview  Goal: Plan of Care Review  Pt had hemorrhoidectomy today, packed with guaze. Orders for sitz's baths to start this evening or in am. IVF, tolerating a regular diet. Rates pain 5/10 meds given as ordered   Goal: Individualization and Mutuality  Outcome: Ongoing (interventions implemented as appropriate)      Problem: Surgery Nonspecified (Adult)  Goal: Signs and Symptoms of Listed Potential Problems Will be Absent, Minimized or Managed (Surgery Nonspecified)  Outcome: Ongoing (interventions implemented as appropriate)

## 2018-05-08 NOTE — OP NOTE
HEMORRHOIDECTOMY  Procedure Note    Jacob Saravia  5/8/2018    Pre-op Diagnosis:   HEMORRHOIDS grade 3    Post-op Diagnosis:     Grade 3 internal and external hemorrhoids    Procedure/CPT® Codes:      Procedure(s):    3 group hemorrhoidectomy    Surgeon(s):  Timoteo Mckinney MD    Anesthesia: General    Staff:   Circulator: Claudia Hui RN  Scrub Person: Jewels Larson; Betsey Jones; Liz Philip    Estimated Blood Loss:  100 mL    Specimens:                ID Type Source Tests Collected by Time   A : HEMORRHOID TISSUE Tissue Hemorrhoid(s) TISSUE PATHOLOGY EXAM Timoteo Mckinney MD 5/8/2018 0833         Drains:  No drains    Indications: Mr. Jacob Saravia is a 67-year-old gentleman who has had a 15-20 year history of having increasingly symptomatic hemorrhoids.  He was tried as best to control these he has been on multiple conservative type measures to treat these but these have been unsuccessful in his treatment.  With the above problem he is for a 3 group hemorrhoidectomy in treatment of increasingly symptomatic, grade 3 internal and external hemorrhoids.  Risk and benefits discussed he gives his informed consent for surgery.    Findings: 3 group hemorrhoidectomy completed with full visualization of the internal and external sphincter muscle and avoidance of this, closure accomplished no other abnormalities    Complications: There were no complications blood loss 100 mL     Procedure: Patient was placed in a prone jackknife position in the surgical suite and after adequate prepping and draping of been completed as well as a timeout accomplished the anus was dilated to 3 fingers and local anesthesia of 1% Xylocaine with epinephrine was infiltrated that 3 sites.  With this accomplished the anus was evaluated there is noted to be very large right and left lateral hemorrhoidal groups the anterior aspect was somewhat smaller on the right at this point a Hill-Camp retractor was placed after the timeout had  been accomplished and a Allis clamp was placed at the apex as well as at the dentate line.  A figure-of-eight suture was placed above the apex with 2-0 Vicryl suture and the hemorrhoidal group was outlined with a 15 blade and excision of the hemorrhoidal group and the other subcutaneous tissue was completed with full visualization and avoidance of the external and internal sphincter muscle.  With full excision accomplished there was no bleeding and with a dry bed noted and dissection completed using the Harmonic scalpel closure was accomplished using a running locking suture of 2-0 Vicryl changing it to a running nonlocking suture from the dentate line out.  With this accomplished attention was then turned toward the right anterior hemorrhoidal group once again a figure-of-eight suture was placed to the apex excision of the hemorrhoidal group was completed with full visualization of the internal and external sphincter muscle and closure using a running 2-0 Vicryl suture locking until the dentate line and then nonlocking from the dentate line out.  The final group was also on the larger hemorrhoidal groups a smaller Hill-Camp retractor was placed in a figure-of-eight suture was placed at the apex and excision of the hemorrhoidal tissue was completed with a 15 blade.  Further dissection and removal was accomplished with a Harmonic scalpel and once again full visualization of the internal and external sphincter muscles were noted and avoided.  Following this closure was completed using a running locking suture of 2-0 Vicryl changing to a running nonlocking suture at the dentate line out and with full excision accomplished the staple lines were noted there was no bleeding appreciated and following this a rolled moistened portion of the Gelfoam was placed 4 x 4's were placed and the exterior as well as a ABDs.  Following this he was extubated and transferred to the recovery room in good condition blood loss less than  100 mL.    Timoteo Mckinney MD     Date: 5/8/2018  Time: 9:31 AM    EMR Dragon/Transcription disclaimer: Much of this encounter note is an electronic transcription/translation of spoken language to printed text. The electronic translation of spoken language may permit erroneous, or at times, nonsensical words or phrases to be inadvertently transcribed; although I have reviewed the note for such errors, some may still exist.

## 2018-05-08 NOTE — PROGRESS NOTES
He looks very good 3 hours out from surgery he is having no bleeding is not urinated to this point I will give him 1 L bolus of lactated Ringer's to see if we can stimulate him to urinate no other changes begin sitz baths later today or tomorrow no bleeding from the dressing

## 2018-05-08 NOTE — ANESTHESIA POSTPROCEDURE EVALUATION
"Patient: Jacob Saravia    Procedure Summary     Date:  05/08/18 Room / Location:   [facility]  /  [facility]    Anesthesia Start:  0804 Anesthesia Stop:  0932    Procedure:  HEMORRHOIDECTOMY (N/A Anus) Diagnosis:  (HEMORRHOIDS)    Surgeon:  Timoteo Mckinney MD Provider:  Katharina Martinez CRNA    Anesthesia Type:  general ASA Status:  2          Anesthesia Type: general  Last vitals  BP   134/68 (05/08/18 1030)   Temp   97.4 °F (36.3 °C) (05/08/18 1030)   Pulse   67 (05/08/18 1030)   Resp   16 (05/08/18 1030)     SpO2   100 % (05/08/18 1030)     Post Anesthesia Care and Evaluation    Patient location during evaluation: PACU  Patient participation: complete - patient participated  Level of consciousness: awake and alert  Pain management: adequate  Airway patency: patent  Anesthetic complications: No anesthetic complications  PONV Status: controlled  Cardiovascular status: acceptable and hemodynamically stable  Respiratory status: acceptable  Hydration status: acceptable    Comments: Patient discharged from PACU prior to anesthesia evaluation based on Alexa Score.  For details, see RN note.     /68 (BP Location: Left arm, Patient Position: Lying)   Pulse 67   Temp 97.4 °F (36.3 °C) (Axillary)   Resp 16   Ht 177 cm (69.69\")   Wt 65.1 kg (143 lb 8.3 oz)   SpO2 100%   BMI 20.78 kg/m²       "

## 2018-05-08 NOTE — ANESTHESIA PREPROCEDURE EVALUATION
Anesthesia Evaluation     NPO Solid Status: > 8 hours  NPO Liquid Status: > 4 hours           Airway   Mallampati: II  Neck ROM: limited  No difficulty expected  Dental    (+) upper dentures    Pulmonary - negative pulmonary ROS and normal exam   Cardiovascular - normal exam  Exercise tolerance: good (4-7 METS)    ECG reviewed  Patient on routine beta blocker and Beta blocker given within 24 hours of surgery    (+) hypertension, hyperlipidemia,       Neuro/Psych- negative ROS  GI/Hepatic/Renal/Endo    (+)  GERD,      Musculoskeletal (-) negative ROS    Abdominal  - normal exam   Substance History - negative use     OB/GYN negative ob/gyn ROS         Other - negative ROS                       Anesthesia Plan    ASA 2     general     intravenous induction   Anesthetic plan and risks discussed with patient.

## 2018-05-09 VITALS
HEIGHT: 70 IN | DIASTOLIC BLOOD PRESSURE: 59 MMHG | TEMPERATURE: 98.3 F | HEART RATE: 71 BPM | WEIGHT: 143.52 LBS | OXYGEN SATURATION: 100 % | RESPIRATION RATE: 16 BRPM | BODY MASS INDEX: 20.55 KG/M2 | SYSTOLIC BLOOD PRESSURE: 117 MMHG

## 2018-05-09 LAB
ALBUMIN SERPL-MCNC: 3 G/DL (ref 3.5–5)
ALBUMIN/GLOB SERPL: 1.2 G/DL (ref 1.1–2.5)
ALP SERPL-CCNC: 64 U/L (ref 24–120)
ALT SERPL W P-5'-P-CCNC: 24 U/L (ref 0–54)
ANION GAP SERPL CALCULATED.3IONS-SCNC: 7 MMOL/L (ref 4–13)
AST SERPL-CCNC: 26 U/L (ref 7–45)
BASOPHILS # BLD AUTO: 0.03 10*3/MM3 (ref 0–0.2)
BASOPHILS NFR BLD AUTO: 0.3 % (ref 0–2)
BILIRUB SERPL-MCNC: 0.5 MG/DL (ref 0.1–1)
BUN BLD-MCNC: 20 MG/DL (ref 5–21)
BUN/CREAT SERPL: 16 (ref 7–25)
CALCIUM SPEC-SCNC: 8.3 MG/DL (ref 8.4–10.4)
CHLORIDE SERPL-SCNC: 102 MMOL/L (ref 98–110)
CO2 SERPL-SCNC: 26 MMOL/L (ref 24–31)
CREAT BLD-MCNC: 1.25 MG/DL (ref 0.5–1.4)
CYTO UR: NORMAL
DEPRECATED RDW RBC AUTO: 40.7 FL (ref 40–54)
EOSINOPHIL # BLD AUTO: 0.03 10*3/MM3 (ref 0–0.7)
EOSINOPHIL NFR BLD AUTO: 0.3 % (ref 0–4)
ERYTHROCYTE [DISTWIDTH] IN BLOOD BY AUTOMATED COUNT: 12.7 % (ref 12–15)
GFR SERPL CREATININE-BSD FRML MDRD: 58 ML/MIN/1.73
GLOBULIN UR ELPH-MCNC: 2.6 GM/DL
GLUCOSE BLD-MCNC: 98 MG/DL (ref 70–100)
HCT VFR BLD AUTO: 32.1 % (ref 40–52)
HGB BLD-MCNC: 11.1 G/DL (ref 14–18)
IMM GRANULOCYTES # BLD: 0.05 10*3/MM3 (ref 0–0.03)
IMM GRANULOCYTES NFR BLD: 0.4 % (ref 0–5)
LAB AP CASE REPORT: NORMAL
LYMPHOCYTES # BLD AUTO: 2.37 10*3/MM3 (ref 0.72–4.86)
LYMPHOCYTES NFR BLD AUTO: 20.3 % (ref 15–45)
Lab: NORMAL
MCH RBC QN AUTO: 30.5 PG (ref 28–32)
MCHC RBC AUTO-ENTMCNC: 34.6 G/DL (ref 33–36)
MCV RBC AUTO: 88.2 FL (ref 82–95)
MONOCYTES # BLD AUTO: 0.75 10*3/MM3 (ref 0.19–1.3)
MONOCYTES NFR BLD AUTO: 6.4 % (ref 4–12)
NEUTROPHILS # BLD AUTO: 8.43 10*3/MM3 (ref 1.87–8.4)
NEUTROPHILS NFR BLD AUTO: 72.3 % (ref 39–78)
NRBC BLD MANUAL-RTO: 0 /100 WBC (ref 0–0)
PATH REPORT.FINAL DX SPEC: NORMAL
PATH REPORT.GROSS SPEC: NORMAL
PLATELET # BLD AUTO: 186 10*3/MM3 (ref 130–400)
PMV BLD AUTO: 11.1 FL (ref 6–12)
POTASSIUM BLD-SCNC: 4.5 MMOL/L (ref 3.5–5.3)
PROT SERPL-MCNC: 5.6 G/DL (ref 6.3–8.7)
RBC # BLD AUTO: 3.64 10*6/MM3 (ref 4.8–5.9)
SODIUM BLD-SCNC: 135 MMOL/L (ref 135–145)
WBC NRBC COR # BLD: 11.66 10*3/MM3 (ref 4.8–10.8)

## 2018-05-09 PROCEDURE — 80053 COMPREHEN METABOLIC PANEL: CPT | Performed by: SPECIALIST

## 2018-05-09 PROCEDURE — A9270 NON-COVERED ITEM OR SERVICE: HCPCS | Performed by: SPECIALIST

## 2018-05-09 PROCEDURE — G0378 HOSPITAL OBSERVATION PER HR: HCPCS

## 2018-05-09 PROCEDURE — 85025 COMPLETE CBC W/AUTO DIFF WBC: CPT | Performed by: SPECIALIST

## 2018-05-09 PROCEDURE — 63710000001 METHYLCELLULOSE (LAXATIVE) 500 MG TABLET: Performed by: SPECIALIST

## 2018-05-09 PROCEDURE — 63710000001 OXYCODONE-ACETAMINOPHEN 5-325 MG TABLET: Performed by: SPECIALIST

## 2018-05-09 RX ORDER — METHYLCELLULOSE 2 G/19G
2 POWDER, FOR SOLUTION ORAL DAILY
Qty: 60 G | Refills: 6 | Status: SHIPPED | OUTPATIENT
Start: 2018-05-09 | End: 2018-06-08

## 2018-05-09 RX ORDER — ONDANSETRON HCL 8 MG
8 TABLET ORAL EVERY 8 HOURS PRN
Qty: 10 TABLET | Refills: 1 | Status: SHIPPED | OUTPATIENT
Start: 2018-05-09

## 2018-05-09 RX ORDER — OXYCODONE HYDROCHLORIDE AND ACETAMINOPHEN 5; 325 MG/1; MG/1
1 TABLET ORAL EVERY 4 HOURS PRN
Qty: 40 TABLET | Refills: 0 | Status: SHIPPED | OUTPATIENT
Start: 2018-05-09

## 2018-05-09 RX ADMIN — METRONIDAZOLE 500 MG: 500 INJECTION, SOLUTION INTRAVENOUS at 02:19

## 2018-05-09 RX ADMIN — Medication 1000 MG: at 09:10

## 2018-05-09 RX ADMIN — OXYCODONE HYDROCHLORIDE AND ACETAMINOPHEN 1 TABLET: 5; 325 TABLET ORAL at 06:52

## 2018-05-09 NOTE — DISCHARGE SUMMARY
Date of Discharge:  5/9/2018    Discharge Diagnosis: Grade 3 internal and external hemorrhoids    Presenting Problem/History of Present Illness      Mr. Jacob Saravia is a 67-year-old gentleman who has had a 15-20 year history of having increasingly symptomatic hemorrhoids.  He was tried as best to control these he has been on multiple conservative type measures to treat these but these have been unsuccessful in his treatment.  With the above problem he is for a 3 group hemorrhoidectomy in treatment of increasingly symptomatic, grade 3 internal and external hemorrhoids.  Risk and benefits discussed he gives his informed consent for surgery.     Findings: 3 group hemorrhoidectomy completed with full visualization of the internal and external sphincter muscle and avoidance of this, closure accomplished no other abnormalities    Patient with subsequent admitted had surgery completed with 3 group hemorrhoidectomy completed he is done well from his surgery he is increasing his activity his pain is well-controlled he has had no bleeding he is urinating well his hematocrit this morning is 32 his potassium is 4.5.  His unwell from his surgery.  Currently we will look toward discharging him to his home she will follow-up with me in 2 weeks sooner if questions or problems arise.        Procedures Performed  Procedure(s):  HEMORRHOIDECTOMY       Consults:   Consults     No orders found for last 30 day(s).          Pertinent Test Results:   Lab Results (last 24 hours)     Procedure Component Value Units Date/Time    Comprehensive Metabolic Panel [[id number]]  (Abnormal) Collected:  05/09/18 0605    Specimen:  Blood Updated:  05/09/18 0727     Glucose 98 mg/dL      BUN 20 mg/dL      Creatinine 1.25 mg/dL      Sodium 135 mmol/L      Potassium 4.5 mmol/L      Chloride 102 mmol/L      CO2 26.0 mmol/L      Calcium 8.3 (L) mg/dL      Total Protein 5.6 (L) g/dL      Albumin 3.00 (L) g/dL      ALT (SGPT) 24 U/L      AST (SGOT) 26 U/L       Alkaline Phosphatase 64 U/L      Total Bilirubin 0.5 mg/dL      eGFR Non African Amer 58 (L) mL/min/1.73      Globulin 2.6 gm/dL      A/G Ratio 1.2 g/dL      BUN/Creatinine Ratio 16.0     Anion Gap 7.0 mmol/L     CBC & Differential [[id number]] Collected:  05/09/18 0605    Specimen:  Blood Updated:  05/09/18 0702    Narrative:       The following orders were created for panel order CBC & Differential.  Procedure                               Abnormality         Status                     ---------                               -----------         ------                     CBC Auto Differential[[id number]]        Abnormal            Final result                 Please view results for these tests on the individual orders.    CBC Auto Differential [[id number]]  (Abnormal) Collected:  05/09/18 0605    Specimen:  Blood Updated:  05/09/18 0702     WBC 11.66 (H) 10*3/mm3      RBC 3.64 (L) 10*6/mm3      Hemoglobin 11.1 (L) g/dL      Hematocrit 32.1 (L) %      MCV 88.2 fL      MCH 30.5 pg      MCHC 34.6 g/dL      RDW 12.7 %      RDW-SD 40.7 fl      MPV 11.1 fL      Platelets 186 10*3/mm3      Neutrophil % 72.3 %      Lymphocyte % 20.3 %      Monocyte % 6.4 %      Eosinophil % 0.3 %      Basophil % 0.3 %      Immature Grans % 0.4 %      Neutrophils, Absolute 8.43 (H) 10*3/mm3      Lymphocytes, Absolute 2.37 10*3/mm3      Monocytes, Absolute 0.75 10*3/mm3      Eosinophils, Absolute 0.03 10*3/mm3      Basophils, Absolute 0.03 10*3/mm3      Immature Grans, Absolute 0.05 (H) 10*3/mm3      nRBC 0.0 /100 WBC     Tissue Pathology Exam [[id number]] Collected:  05/08/18 0833    Specimen:  Tissue from Hemorrhoid(s) Updated:  05/08/18 6592            Condition on Discharge:  Good condition      Discharge Disposition discharged to her home      Discharge Medications   Jacob Saravia   Home Medication Instructions PAULO:[id number]    Printed on:05/09/18 0810   Medication Information                      esomeprazole (nexIUM) 40 MG  capsule  Take 40 mg by mouth Every Morning Before Breakfast.             olmesartan (BENICAR) 40 MG tablet  Take 40 mg by mouth Daily.             propranolol LA (INDERAL LA) 80 MG 24 hr capsule  Take 80 mg by mouth Daily.                 Discharge Diet:  regular diet    Activity at Discharge:  patient is to undergo sits baths at least 4 times a day more if possible and upon completion place a 4 x 4 over the anus follow-up with me in 2 weeks.    Follow-up Appointments follow-up with me in 2 weeks.  No future appointments.      Test Results Pending at Discharge   Order Current Status    Tissue Pathology Exam In process           Timoteo Mckinney MD  05/09/18  8:10 AM    Time: Time spent at discharge 30 minutes     EMR Dragon/Transcription disclaimer: Much of this encounter note is an electronic transcription/translation of spoken language to printed text. The electronic translation of spoken language may permit erroneous, or at times, nonsensical words or phrases to be inadvertently transcribed; although I have reviewed the note for such errors, some may still exist.

## 2018-05-09 NOTE — NURSING NOTE
"During shift report, patient and PM nurse inform myself that he has taken his propranolol out of his own home medication the night before (5/8/18) because this is a \"bedtime medication\". He also informed me that he has already taken his own home Benicar this AM. Will hold both medication according to the MAR however, patient has received both medications per nurse and patient. Medications were sent to pharmacy this AM for verification however, pharmacy states that medication cannot be verified because patient did not have an order to take his own home medication. Per nurse inspection of patient medication bottles, they do appear to match the ordered dose, route, medication,patient, and frequency as the prescribed doses.   "

## 2018-05-09 NOTE — PLAN OF CARE
Problem: Patient Care Overview  Goal: Plan of Care Review  Outcome: Ongoing (interventions implemented as appropriate)   05/09/18 0327   Coping/Psychosocial   Plan of Care Reviewed With patient   OTHER   Outcome Summary Does not want hospital generic meds; wants name brand home meds of inderal and benicar     Goal: Individualization and Mutuality  Outcome: Ongoing (interventions implemented as appropriate)   05/09/18 0327   Individualization   Patient Specific Preferences keep room dark at nite and do not interrupt pt sleep per pt request;       Problem: Surgery Nonspecified (Adult)  Goal: Signs and Symptoms of Listed Potential Problems Will be Absent, Minimized or Managed (Surgery Nonspecified)  Outcome: Ongoing (interventions implemented as appropriate)   05/09/18 0327   Goal/Outcome Evaluation   Problems Assessed (Surgery) all   Problems Present (Surgery) pain

## 2018-05-09 NOTE — PLAN OF CARE
Problem: Patient Care Overview  Goal: Plan of Care Review  Outcome: Outcome(s) achieved Date Met: 05/09/18 05/09/18 1035   Coping/Psychosocial   Plan of Care Reviewed With patient   OTHER   Outcome Summary Patient d/c home to the care of spouse and self. Denies nausea. Tolerating diet. Reports pain however, reports enough control w/ minimal pain medication. Patient did dsg change and sitz bath with wife. Was unable to visualize incision area d/t patient refuses to allow staff. States he only wants his wife or Dr. Mckinney to look. Verbalizes no draining, redness, or swelling.    Plan of Care Review   Progress improving     Goal: Individualization and Mutuality  Outcome: Outcome(s) achieved Date Met: 05/09/18    Goal: Discharge Needs Assessment  Outcome: Outcome(s) achieved Date Met: 05/09/18    Goal: Interprofessional Rounds/Family Conf  Outcome: Outcome(s) achieved Date Met: 05/09/18      Problem: Surgery Nonspecified (Adult)  Goal: Signs and Symptoms of Listed Potential Problems Will be Absent, Minimized or Managed (Surgery Nonspecified)  Outcome: Outcome(s) achieved Date Met: 05/09/18 05/09/18 1035   Goal/Outcome Evaluation   Problems Assessed (Surgery) all   Problems Present (Surgery) pain     Goal: Anesthesia/Sedation Recovery  Outcome: Outcome(s) achieved Date Met: 05/09/18

## 2021-01-01 NOTE — TELEPHONE ENCOUNTER
Spoke with pt on phone this morning  He plans for hemorrhoidectomy on 5/8/18 with Dr Mckinney  Pt wishes to wait until after surgery to proceed with cscope as he is fearful undergoing prep will cause him increased bleeding and more discomfort    He will call office when he is ready to schedule cscope    Infant (Birth)

## 2024-12-19 ENCOUNTER — HOSPITAL ENCOUNTER (EMERGENCY)
Facility: HOSPITAL | Age: 74
Discharge: HOME | End: 2024-12-19
Attending: FAMILY MEDICINE
Payer: MEDICARE

## 2024-12-19 ENCOUNTER — APPOINTMENT (OUTPATIENT)
Dept: GENERAL RADIOLOGY | Facility: HOSPITAL | Age: 74
End: 2024-12-19
Payer: MEDICARE

## 2024-12-19 VITALS
RESPIRATION RATE: 18 BRPM | WEIGHT: 134.5 LBS | SYSTOLIC BLOOD PRESSURE: 136 MMHG | HEIGHT: 70 IN | HEART RATE: 75 BPM | DIASTOLIC BLOOD PRESSURE: 79 MMHG | OXYGEN SATURATION: 99 % | BODY MASS INDEX: 19.26 KG/M2 | TEMPERATURE: 98 F

## 2024-12-19 DIAGNOSIS — K64.8 OTHER HEMORRHOIDS: ICD-10-CM

## 2024-12-19 DIAGNOSIS — K59.09 OTHER CONSTIPATION: Primary | ICD-10-CM

## 2024-12-19 DIAGNOSIS — R33.9 URINARY RETENTION: ICD-10-CM

## 2024-12-19 LAB
BILIRUB UR QL STRIP: NEGATIVE
CLARITY UR: CLEAR
COLOR UR: YELLOW
GLUCOSE UR STRIP-MCNC: NEGATIVE MG/DL
HGB UR QL STRIP.AUTO: NEGATIVE
KETONES UR QL STRIP: ABNORMAL
LEUKOCYTE ESTERASE UR QL STRIP.AUTO: NEGATIVE
NITRITE UR QL STRIP: NEGATIVE
PH UR STRIP.AUTO: 6 [PH] (ref 5–8)
PROT UR QL STRIP: NEGATIVE
SP GR UR STRIP: 1.02 (ref 1–1.03)
UROBILINOGEN UR QL STRIP: ABNORMAL

## 2024-12-19 PROCEDURE — 74018 RADEX ABDOMEN 1 VIEW: CPT

## 2024-12-19 PROCEDURE — 99283 EMERGENCY DEPT VISIT LOW MDM: CPT

## 2024-12-19 PROCEDURE — 81003 URINALYSIS AUTO W/O SCOPE: CPT | Performed by: FAMILY MEDICINE

## 2024-12-19 PROCEDURE — 51798 US URINE CAPACITY MEASURE: CPT

## 2024-12-19 RX ORDER — HYDROCORTISONE 25 MG/G
CREAM TOPICAL 2 TIMES DAILY
Qty: 28 G | Refills: 0 | Status: SHIPPED | OUTPATIENT
Start: 2024-12-19 | End: 2024-12-26

## 2024-12-20 NOTE — ED PROVIDER NOTES
"HPI:     Patient is a 74-year-old white man who presents to the emergency room with complaint of constipation and now with urinary retention.  Last time urinating about 1800 today and that was only \"dribbles\".  He started to get more increased pressure in the lower pelvic area.  There is no trauma there was no hematuria with this.  Constipation occurred well before this a few days to weeks before.  He states he was about to take Dulcolax tonight but due to the a poor inability to urinate he decided not to take it until he was seen for this urinary retention.  Rates the discomfort in lower pelvis 7 out of 10.      REVIEW OF SYSTEMS    CONSTITUTIONAL:  No complaints of fever, chills,or weakness  EYES:  No complaints of discharge   ENT: No complaints of sore throat or ear pain  CARDIOVASCULAR:  No complaints of chest pain, palpitations, or swelling  RESPIRATORY:  No complaints of cough or shortness of breath  GI: Positive for constipation MUSCULOSKELETAL:  No complaints of back pain  SKIN:  No complaints of rash  NEUROLOGIC:  No complaints of headache, focal weakness, or sensory changes  ENDOCRINE:  No complaints of polyuria or polydipsia  LYMPHATIC:  No complaints of swollen glands  GENITOURINARY: Positive for urinary retention and urgency and pelvic pain    PAST MEDICAL HISTORY  Past Medical History:   Diagnosis Date    Acid reflux     Collapsed lung     1973, right side    Hemorrhoid     Hyperlipidemia     Hypertension     PVC (premature ventricular contraction)        FAMILY HISTORY  Family History   Problem Relation Age of Onset    Colon cancer Neg Hx     Esophageal cancer Neg Hx        SOCIAL HISTORY  Social History     Socioeconomic History    Marital status:    Tobacco Use    Smoking status: Former    Smokeless tobacco: Never    Tobacco comments:     [id number]   Substance and Sexual Activity    Alcohol use: No    Drug use: No    Sexual activity: Defer       IMMUNIZATION HISTORY  Deferred to primary care " Ok to change to buspar.   Buspar 7.5mg twice daily sent to [facility] [place] "physician.    SURGICAL HISTORY  Past Surgical History:   Procedure Laterality Date    CHOLECYSTECTOMY      HEMORRHOIDECTOMY N/A 5/8/2018    Procedure: HEMORRHOIDECTOMY;  Surgeon: Timoteo Mckinney MD;  Location: [facility] OR;  Service: General       CURRENT MEDICATIONS  No current facility-administered medications for this encounter.    Current Outpatient Medications:     esomeprazole (nexIUM) 40 MG capsule, Take 40 mg by mouth Every Morning Before Breakfast., Disp: , Rfl:     Hydrocortisone, Perianal, (ANUSOL-HC) 2.5 % rectal cream, Insert  into the rectum 2 (Two) Times a Day for 7 days., Disp: 28 g, Rfl: 0    magnesium citrate solution, Take 296 mL by mouth 1 (One) Time for 1 dose., Disp: 296 mL, Rfl: 0    olmesartan (BENICAR) 40 MG tablet, Take 40 mg by mouth Daily., Disp: , Rfl:     PERCOCET 5-325 MG per tablet, Take 1 tablet by mouth Every 4 (Four) Hours As Needed (severe pain) for up to 40 doses., Disp: 40 tablet, Rfl: 0    propranolol LA (INDERAL LA) 80 MG 24 hr capsule, Take 80 mg by mouth Daily., Disp: , Rfl:     ZOFRAN 8 MG tablet, Take 1 tablet by mouth Every 8 (Eight) Hours As Needed for Nausea or Vomiting for up to 10 doses., Disp: 10 tablet, Rfl: 1    ALLERGIES  Allergies   Allergen Reactions    Crestor [Rosuvastatin Calcium] Unknown (See Comments)     reflux    Decongestant [Oxymetazoline] Other (See Comments)     bothers heart, increases pvc    Erythromycin Base Unknown (See Comments)     unsure    Ezetimibe Other (See Comments)     unsure    Omeprazole Other (See Comments)     hearache    Pravachol [Pravastatin Sodium] Other (See Comments)     reflux       ABDOMINAL EXAM    VITAL SIGNS:   /75 (BP Location: Right arm, Patient Position: Sitting)   Pulse 78   Temp 98 °F (36.7 °C) (Oral)   Resp 17   Ht 176.5 cm (69.5\")   Wt 61 kg (134 lb 8 oz)   SpO2 98%   BMI 19.58 kg/m²     Constitutional: Patient is alert and in no distress.  Patient with moderate lower abdomen and pelvic " discomfort.    Cardiovascular: S1-S2 regular rate and rhythm. No murmurs, rubs or gallops.  Pulses are equal bilaterally and there is no pitting edema.    Respiratory: Patient is clear to auscultation bilaterally with no wheezing or rhonchi.  Chest wall is nontender.  There are no external lesions on the chest.  There is no crepitance.    Gastrointestinal: Bowel sounds normal in all 4 quadrants.  There is no abdominal distention or fluid wave.    Genitourinary: Distended urinary bladder is noted with mild tenderness  Integument: No acute lesions noted.  Color appears to be normal.    [place] Coma Scale: Total score no    Neurological: Patient is alert and oriented x4 and no acute findings noted.  Speech is fluent and cognition is normal.  No evidence of acute CVA.  Cranial nerves II through XII intact.  Patient with normal motor function as well as reflexes and sensation    Psychiatric: Normal affect and mood.            RADIOLOGY/PROCEDURES        XR Abdomen KUB   Final Result   Moderate constipation.       This report was signed and finalized on 12/19/2024 10:22 PM by Dr. Guillermo Frank MD.                 FUTURE APPOINTMENTS     No future appointments.           COURSE & MEDICAL DECISION MAKING       Patient's partial differential diagnosis can include:    Acute urinary retention, prostatitis, prostatic hypertrophy, UTI, pyelonephritis, constipation, partial bowel obstruction, bowel obstruction, colitis and others    Moderate constipation noted on KUB.  Only 100 cc noted of urine in the bladder.  Not enough to catheterize the patient.  Explained to the patient with his hemorrhoids Proctofoam or ProctoCream could be given.  He would like the prescription.  Also magnesium citrate along with his Dulcolax for his constipation.  Follow-up with with his PCP he feels comfortable with the plan and discharge planning    Patient's level of risk: Low       CRITICAL CARE    CRITICAL CARE: No    CRITICAL CARE TIME:  None      Recent Results (from the past 24 hours)   Urinalysis With Culture If Indicated - Urine, Clean Catch    Collection Time: 12/19/24 10:09 PM    Specimen: Urine, Clean Catch   Result Value Ref Range    Color, UA Yellow Yellow, Straw    Appearance, UA Clear Clear    pH, UA 6.0 5.0 - 8.0    Specific Gravity, UA 1.021 1.005 - 1.030    Glucose, UA Negative Negative    Ketones, UA Trace (A) Negative    Bilirubin, UA Negative Negative    Blood, UA Negative Negative    Protein, UA Negative Negative    Leuk Esterase, UA Negative Negative    Nitrite, UA Negative Negative    Urobilinogen, UA 1.0 E.U./dL 0.2 - 1.0 E.U./dL          Old charts were reviewed per [employer] EMR.  Pertinent details are summarized above.  All laboratory, radiologic, and EKG studies that were performed in the Emergency Department were a necessary part of the evaluation needed to exclude unstable or  emergent medical conditions.     Patient was hemodynamically and neurologically stable in the ED.   Pertinent studies were reviewed as above.     The patient received:  Medications - No data to display         Diagnoses that have been ruled out:   None   Diagnoses that are still under consideration:   None   Final diagnoses:   Other constipation   Urinary retention   Other hemorrhoids        FINAL IMPRESSION   Diagnosis Plan   1. Other constipation        2. Urinary retention        3. Other hemorrhoids              Low Ruby Jr, MD        ED Disposition       ED Disposition   Discharge    Condition   Stable    Comment   --                 Dragon disclaimer:  Part of this note may be an electronic transcription/translation of spoken language to printed text using the Dragon Dictation System.     I have reviewed the patient’s prescription history via a prescription monitoring program.  This information is consistent with my knowledge of the patient’s controlled substance use history.        Low Ruby Jr., MD  12/19/24 5254

## 2024-12-20 NOTE — DISCHARGE INSTRUCTIONS
Be sure to drink plenty water or even more so Gatorade or Powerade as you used your Dulcolax and magnesium citrate tomorrow.  Do it during the day and night in the evening.  Should help relieve your constipation.  If you continue to have constipation intermittently would recommend starting a daily MiraLAX just to maintain your bowels having 1 or 2 bowel movements daily.

## 2024-12-31 ENCOUNTER — OFFICE VISIT (OUTPATIENT)
Dept: PRIMARY CARE CLINIC | Age: 74
End: 2024-12-31
Payer: MEDICARE

## 2024-12-31 VITALS
HEART RATE: 66 BPM | BODY MASS INDEX: 19.55 KG/M2 | DIASTOLIC BLOOD PRESSURE: 70 MMHG | HEIGHT: 69 IN | WEIGHT: 132 LBS | TEMPERATURE: 97.7 F | SYSTOLIC BLOOD PRESSURE: 120 MMHG | OXYGEN SATURATION: 95 %

## 2024-12-31 DIAGNOSIS — J02.9 SORE THROAT: ICD-10-CM

## 2024-12-31 DIAGNOSIS — J06.9 VIRAL UPPER RESPIRATORY ILLNESS: Primary | ICD-10-CM

## 2024-12-31 PROBLEM — K64.9 HEMORRHOIDS: Status: ACTIVE | Noted: 2018-05-08

## 2024-12-31 LAB
INFLUENZA A ANTIBODY: NEGATIVE
INFLUENZA B ANTIBODY: NEGATIVE
Lab: NORMAL
QC PASS/FAIL: NORMAL
S PYO AG THROAT QL: NORMAL
SARS-COV-2, POC: NORMAL

## 2024-12-31 PROCEDURE — 1036F TOBACCO NON-USER: CPT

## 2024-12-31 PROCEDURE — 87880 STREP A ASSAY W/OPTIC: CPT

## 2024-12-31 PROCEDURE — 1123F ACP DISCUSS/DSCN MKR DOCD: CPT

## 2024-12-31 PROCEDURE — 1159F MED LIST DOCD IN RCRD: CPT

## 2024-12-31 PROCEDURE — G8484 FLU IMMUNIZE NO ADMIN: HCPCS

## 2024-12-31 PROCEDURE — 87811 SARS-COV-2 COVID19 W/OPTIC: CPT

## 2024-12-31 PROCEDURE — G8427 DOCREV CUR MEDS BY ELIG CLIN: HCPCS

## 2024-12-31 PROCEDURE — 87804 INFLUENZA ASSAY W/OPTIC: CPT

## 2024-12-31 PROCEDURE — 3017F COLORECTAL CA SCREEN DOC REV: CPT

## 2024-12-31 PROCEDURE — G8420 CALC BMI NORM PARAMETERS: HCPCS

## 2024-12-31 PROCEDURE — 99203 OFFICE O/P NEW LOW 30 MIN: CPT

## 2024-12-31 RX ORDER — HYDROCORTISONE 25 MG/G
OINTMENT TOPICAL 2 TIMES DAILY
COMMUNITY

## 2024-12-31 RX ORDER — OLMESARTAN MEDOXOMIL 40 MG/1
40 TABLET ORAL DAILY
COMMUNITY
Start: 2024-12-20

## 2024-12-31 RX ORDER — CIPROFLOXACIN 500 MG/1
500 TABLET, FILM COATED ORAL EVERY 12 HOURS
COMMUNITY
Start: 2024-12-23

## 2024-12-31 RX ORDER — ATORVASTATIN CALCIUM 10 MG/1
10 TABLET, FILM COATED ORAL DAILY
COMMUNITY
Start: 2024-12-20

## 2024-12-31 RX ORDER — PROPRANOLOL HYDROCHLORIDE 80 MG/1
80 CAPSULE, EXTENDED RELEASE ORAL DAILY
COMMUNITY

## 2024-12-31 RX ORDER — ESOMEPRAZOLE MAGNESIUM 40 MG/1
40 CAPSULE, DELAYED RELEASE ORAL
COMMUNITY

## 2024-12-31 NOTE — PATIENT INSTRUCTIONS
Flu, COVID, and Strep test negative in office today.  Advised patient that this is most likely viral in etiology.   Increase fluids and rest.  Throat lozenges, chloraseptic sprays, honey, or salt water gargles as needed for sore throat.  Advise bland diet and advance as tolerated.  Recommended OTC antihistamine, such as Zyrtec.   Monitor for fever and treat as needed with tylenol.  The patient is to follow up with PCP or return to clinic if symptoms worsen/fail to improve.

## 2024-12-31 NOTE — PROGRESS NOTES
Diagnosis Orders   1. Viral upper respiratory illness        2. Sore throat  POCT rapid strep A    POCT Influenza A/B    POCT COVID-19 Antigen Card          Plan     Flu, COVID, and Strep test negative in office today.  Advised patient that this is most likely viral in etiology.   Increase fluids and rest.  Throat lozenges, chloraseptic sprays, honey, or salt water gargles as needed for sore throat.  Advise bland diet and advance as tolerated.  Recommended OTC antihistamine, such as Zyrtec.   Monitor for fever and treat as needed with tylenol.  The patient is to follow up with PCP or return to clinic if symptoms worsen/fail to improve.    Orders Placed This Encounter   Procedures    POCT rapid strep A    POCT Influenza A/B    POCT COVID-19 Antigen Card     Order Specific Question:   Date of Symptom Onset     Answer:   12/29/2024     Order Specific Question:   Reason for Test     Answer:   Symptomatic     Order Specific Question:   Pregnant:     Answer:   No       Results for orders placed or performed in visit on 12/31/24   POCT rapid strep A   Result Value Ref Range    Strep A Ag None Detected None Detected   POCT Influenza A/B   Result Value Ref Range    Influenza A Ab negative     Influenza B Ab negative    POCT COVID-19 Antigen Card   Result Value Ref Range    SARS-COV-2, POC Not-Detected Not Detected    Lot Number [id number]     QC Pass/Fail pass        No orders of the defined types were placed in this encounter.     New Prescriptions    No medications on file        Return if symptoms worsen or fail to improve.         Explained intended effects, potential side effects, and schedule of dosages of medications ordered/discussed. All patient questions were answered. Patient voiced understanding of care plan.   Patient was given educational materials - see patient instructions below. The patient is to follow up with PCP or return to clinic if symptoms worsen/fail to improve.Any condition can change, despite

## (undated) DEVICE — GLV SURG TRIUMPH MICRO PF LTX 7.5 STRL

## (undated) DEVICE — ANTIBACTERIAL UNDYED BRAIDED (POLYGLACTIN 910), SYNTHETIC ABSORBABLE SUTURE: Brand: COATED VICRYL

## (undated) DEVICE — SPNG GZ WOVN 4X4IN 12PLY 10/BX STRL

## (undated) DEVICE — PAD MINOR UNIVERSAL: Brand: MEDLINE INDUSTRIES, INC.

## (undated) DEVICE — PANT KNIT MATERN L/XL 2BG

## (undated) DEVICE — HARMONIC FOCUS SHEARS 9CM LENGTH + ADAPTIVE TISSUE TECHNOLOGY FOR USE WITH BLUE HAND PIECE ONLY: Brand: HARMONIC FOCUS

## (undated) DEVICE — SYR LUERLOK 30CC

## (undated) DEVICE — SHEET, T, LAPAROTOMY, STERILE: Brand: MEDLINE

## (undated) DEVICE — PK TURNOVER RM ADV

## (undated) DEVICE — 3M™ IOBAN™ 2 ANTIMICROBIAL INCISE DRAPE 6650EZ: Brand: IOBAN™ 2

## (undated) DEVICE — HDRST INTUB GENTLETOUCH SLOT 7IN RT

## (undated) DEVICE — TRY PREP SCRB VAG PVP

## (undated) DEVICE — PAD GRND REM POLYHESIVE A/ DISP

## (undated) DEVICE — ELECTRD BLD EDGE/INSUL1P 2.4X5.1MM STRL

## (undated) DEVICE — NDL HYPO PRECISIONGLIDE REG 22G 1 1/2

## (undated) DEVICE — POOLE SUCTION INSTRUMENT WITH REMOVABLE SHEATH: Brand: POOLE

## (undated) DEVICE — UTILITY MARKER W/MED LABELS: Brand: MEDLINE